# Patient Record
Sex: MALE | Race: WHITE | Employment: UNEMPLOYED | URBAN - NONMETROPOLITAN AREA
[De-identification: names, ages, dates, MRNs, and addresses within clinical notes are randomized per-mention and may not be internally consistent; named-entity substitution may affect disease eponyms.]

---

## 2020-03-12 ENCOUNTER — HOSPITAL ENCOUNTER (EMERGENCY)
Age: 35
Discharge: HOME | End: 2020-03-12
Payer: COMMERCIAL

## 2020-03-12 VITALS
RESPIRATION RATE: 16 BRPM | OXYGEN SATURATION: 97 % | DIASTOLIC BLOOD PRESSURE: 92 MMHG | HEART RATE: 115 BPM | WEIGHT: 289 LBS | HEIGHT: 66 IN | BODY MASS INDEX: 46.45 KG/M2 | TEMPERATURE: 98 F | SYSTOLIC BLOOD PRESSURE: 164 MMHG

## 2020-03-12 PROCEDURE — 99202 OFFICE O/P NEW SF 15 MIN: CPT

## 2020-03-12 PROCEDURE — 99202 OFFICE O/P NEW SF 15 MIN: CPT | Performed by: NURSE PRACTITIONER

## 2020-03-12 PROCEDURE — 6360000002 HC RX W HCPCS: Performed by: NURSE PRACTITIONER

## 2020-03-12 PROCEDURE — 90471 IMMUNIZATION ADMIN: CPT | Performed by: NURSE PRACTITIONER

## 2020-03-12 PROCEDURE — 90715 TDAP VACCINE 7 YRS/> IM: CPT | Performed by: NURSE PRACTITIONER

## 2020-03-12 RX ORDER — DOXYCYCLINE HYCLATE 100 MG
100 TABLET ORAL 2 TIMES DAILY
Qty: 14 TABLET | Refills: 0 | Status: SHIPPED | OUTPATIENT
Start: 2020-03-12 | End: 2020-03-19

## 2020-03-12 RX ADMIN — TETANUS TOXOID, REDUCED DIPHTHERIA TOXOID AND ACELLULAR PERTUSSIS VACCINE, ADSORBED 0.5 ML: 5; 2.5; 8; 8; 2.5 SUSPENSION INTRAMUSCULAR at 09:42

## 2020-03-12 SDOH — HEALTH STABILITY: MENTAL HEALTH: HOW OFTEN DO YOU HAVE A DRINK CONTAINING ALCOHOL?: NEVER

## 2020-03-12 ASSESSMENT — ENCOUNTER SYMPTOMS
RHINORRHEA: 0
ABDOMINAL PAIN: 0
EYE REDNESS: 0
NAUSEA: 0
SHORTNESS OF BREATH: 0
COUGH: 0
SORE THROAT: 0
VOMITING: 0
SINUS PRESSURE: 0
SINUS PAIN: 0
WHEEZING: 0
CHEST TIGHTNESS: 0
EYE PAIN: 0
ALLERGIC/IMMUNOLOGIC NEGATIVE: 1
DIARRHEA: 0
COLOR CHANGE: 1

## 2020-03-12 NOTE — ED TRIAGE NOTES
Patient ambulated to room with complaint of infection in right great toe. States Monday he grazed his toe on a metal door frame and has redness and pain with touching.

## 2020-03-12 NOTE — ED PROVIDER NOTES
frequency, hematuria and urgency. Musculoskeletal: Negative for arthralgias, gait problem, joint swelling, myalgias and stiffness. Skin: Positive for color change ( right 1st toe redness). Negative for pallor and rash. Allergic/Immunologic: Negative. Neurological: Negative for weakness, light-headedness and headaches. Hematological: Negative. Psychiatric/Behavioral: Negative. PAST MEDICAL HISTORY   History reviewed. No pertinent past medical history. SURGICAL HISTORY     Patient  has no past surgical history on file. CURRENT MEDICATIONS       Discharge Medication List as of 3/12/2020 10:10 AM          ALLERGIES     Patient is has No Known Allergies. FAMILY HISTORY     Patient'sfamily history is not on file. SOCIAL HISTORY     Patient  reports that he has never smoked. He has never used smokeless tobacco. He reports that he does not drink alcohol or use drugs. PHYSICAL EXAM     ED TRIAGE VITALS  BP: (!) 164/92(provider notified of bp and hr), Temp: 98 °F (36.7 °C), Pulse: 115, Resp: 16, SpO2: 97 %  Physical Exam  Constitutional:       General: He is not in acute distress. Appearance: Normal appearance. He is not ill-appearing. HENT:      Head: Normocephalic. Eyes:      General:         Right eye: No discharge. Left eye: No discharge. Extraocular Movements: Extraocular movements intact. Pupils: Pupils are equal, round, and reactive to light. Neck:      Musculoskeletal: Normal range of motion. No muscular tenderness. Cardiovascular:      Rate and Rhythm: Tachycardia present. Pulses: Normal pulses. Heart sounds: No murmur. Pulmonary:      Effort: Pulmonary effort is normal. No respiratory distress. Breath sounds: Normal breath sounds. No wheezing. Chest:      Chest wall: No tenderness. Abdominal:      Palpations: Abdomen is soft. Tenderness: There is no abdominal tenderness. There is no guarding.    Musculoskeletal: Normal range of motion. General: Swelling ( right 1st toe), tenderness ( right 1st toe) and signs of injury ( right 1st toe) present. Right foot: Normal range of motion. Feet:    Feet:      Right foot:      Skin integrity: Erythema ( right 1st toe) and warmth ( right 1st toe) present. Skin:     General: Skin is warm and dry. Capillary Refill: Capillary refill takes less than 2 seconds. Findings: Erythema ( right 1st toe) present. No rash. Neurological:      General: No focal deficit present. Mental Status: He is alert and oriented to person, place, and time. Motor: No weakness. Psychiatric:         Mood and Affect: Mood normal.         Behavior: Behavior normal.         Thought Content: Thought content normal.         Judgment: Judgment normal.         DIAGNOSTIC RESULTS   Labs: No results found for this visit on 03/12/20. IMAGING:  No orders to display     URGENT CARE COURSE:     Vitals:    03/12/20 0917 03/12/20 1011   BP: (!) 154/92 (!) 164/92   Pulse: 123 115   Resp: 16 16   Temp: 98 °F (36.7 °C)    TempSrc: Oral    SpO2: 97% 97%   Weight: 289 lb (131.1 kg)    Height: 5' 6\" (1.676 m)        Medications   Tetanus-Diphth-Acell Pertussis (BOOSTRIX) injection 0.5 mL (0.5 mLs Intramuscular Given 3/12/20 0942)     PROCEDURES:  None  FINALIMPRESSION    I have reviewed the patient's medical history in detail and updated the computerized patient record. Instructions given for care of the toe, including washing with soap and water, applying bactroban ointment twice daily and use of doxycycline for infection control. Pt to return to ER if condition changes or deteriorates.    1. Infected abrasion of toe of right foot, initial encounter        DISPOSITION/PLAN   DISPOSITION      PATIENT REFERRED TO:  [facility]  [address] [facility]  [phone]  In 3 days  As needed, If symptoms worsen    DISCHARGE MEDICATIONS:  Discharge Medication List as of 3/12/2020 10:10 AM      START taking these medications    Details   mupirocin (BACTROBAN) 2 % ointment Apply topically 3 times daily. , Disp-22 g, R-0, Normal      doxycycline hyclate (VIBRA-TABS) 100 MG tablet Take 1 tablet by mouth 2 times daily for 7 days, Disp-14 tablet, R-0Normal           Discharge Medication List as of 3/12/2020 10:10 AM          GUDELIA Vazquez - GUDELIA Archibald - CNP  03/12/20 [address] [facility], GUDELIA - CNP  03/12/20 1100

## 2020-03-18 ENCOUNTER — TELEPHONE (OUTPATIENT)
Dept: FAMILY MEDICINE CLINIC | Age: 35
End: 2020-03-18

## 2020-03-18 NOTE — TELEPHONE ENCOUNTER
Wife, Dustin Ma, called and is requesting to get patient established with Dr. Rowdy Astudillo but he also needs an Urgent Care follow up for a wound on his toe that has not completely healed yet. Today is his last day of antibiotics. Please advise on scheduling.

## 2020-03-18 NOTE — TELEPHONE ENCOUNTER
Spoke with patient who states pre service misunderstood him and states he was trying to get a hold of the [facility] call center phone number due to he has daughters who are immunocompromised at this time and he has some specific questions and symptoms to discuss and needs to be guided in the right direction.

## 2021-11-17 ENCOUNTER — TELEPHONE (OUTPATIENT)
Dept: FAMILY MEDICINE CLINIC | Age: 36
End: 2021-11-17

## 2021-11-17 NOTE — TELEPHONE ENCOUNTER
LM for pt to call and schedule. New patient appointments must be in office unless they are covid +. Please schedule for new patient appointment with any provider.

## 2021-11-17 NOTE — TELEPHONE ENCOUNTER
Pt returned call. Attemtped to schedule new patient. Pt denied NP in office appointment. Informed pt Office policy new pt appointments in office unless covid +. Pt states he will call back.

## 2022-03-09 ENCOUNTER — TELEMEDICINE (OUTPATIENT)
Dept: FAMILY MEDICINE CLINIC | Age: 37
End: 2022-03-09
Payer: COMMERCIAL

## 2022-03-09 DIAGNOSIS — R45.4 ANGER: ICD-10-CM

## 2022-03-09 DIAGNOSIS — F33.1 MODERATE EPISODE OF RECURRENT MAJOR DEPRESSIVE DISORDER (HCC): ICD-10-CM

## 2022-03-09 PROCEDURE — 99213 OFFICE O/P EST LOW 20 MIN: CPT | Performed by: NURSE PRACTITIONER

## 2022-03-09 RX ORDER — FLUOXETINE HYDROCHLORIDE 40 MG/1
40 CAPSULE ORAL DAILY
Qty: 30 CAPSULE | Refills: 3 | Status: SHIPPED | OUTPATIENT
Start: 2022-03-09 | End: 2022-06-20 | Stop reason: SDUPTHER

## 2022-03-09 ASSESSMENT — ENCOUNTER SYMPTOMS
COUGH: 0
EYE REDNESS: 0
DIARRHEA: 0
RHINORRHEA: 0
ANAL BLEEDING: 0
COLOR CHANGE: 0
BLOOD IN STOOL: 0
ABDOMINAL PAIN: 0
SHORTNESS OF BREATH: 0
NAUSEA: 0
CONSTIPATION: 0
ABDOMINAL DISTENTION: 0
SORE THROAT: 0
EYE DISCHARGE: 0

## 2022-03-09 NOTE — PROGRESS NOTES
Alyssa Breaux (:  1985) is a Established patient, here for evaluation of the following:    Assessment & Plan   Below is the assessment and plan developed based on review of pertinent history, physical exam, labs, studies, and medications. 1. Anger  -     FLUoxetine (PROZAC) 40 MG capsule; Take 1 capsule by mouth daily, Disp-30 capsule, R-3Normal  2. Moderate episode of recurrent major depressive disorder (HCC)  -     FLUoxetine (PROZAC) 40 MG capsule; Take 1 capsule by mouth daily, Disp-30 capsule, R-3Normal    Return in about 3 months (around 2022). Subjective   HPI     takign prozac 20 mg - helping with his mood and anger -he said he notices a difference but was also wondering about a dose increase. He cannot get into see psychology or psychiatry for another few months, has been doing a lot of self-help at home and trying to meditate which has helped. Review of Systems   Constitutional: Negative for chills, fatigue and fever. HENT: Negative for congestion, ear pain, postnasal drip, rhinorrhea and sore throat. Eyes: Negative for discharge and redness. Respiratory: Negative for cough and shortness of breath. Cardiovascular: Negative for chest pain and leg swelling. Gastrointestinal: Negative for abdominal distention, abdominal pain, anal bleeding, blood in stool, constipation, diarrhea and nausea. Skin: Negative for color change and rash. Neurological: Negative for facial asymmetry, speech difficulty and weakness. Hematological: Does not bruise/bleed easily. Psychiatric/Behavioral: Negative for agitation and confusion. Objective   Patient-Reported Vitals  No data recorded     Physical Exam  Constitutional:       Appearance: Normal appearance. He is well-developed. He is not toxic-appearing or diaphoretic. HENT:      Head: Normocephalic and atraumatic. Right Ear: Hearing normal.      Left Ear: Hearing normal.      Nose: No nasal deformity.    Eyes: General:         Right eye: No discharge. Left eye: No discharge. Pulmonary:      Effort: Pulmonary effort is normal. No prolonged expiration or respiratory distress. Musculoskeletal:      Cervical back: Normal range of motion. Skin:     Findings: No rash (On exposed areas visible on camera). Neurological:      Mental Status: He is alert and oriented to person, place, and time. Psychiatric:         Attention and Perception: Attention normal.         Mood and Affect: Mood normal.         Speech: Speech normal.         Behavior: Behavior normal.         Thought Content: Thought content normal.         Cognition and Memory: Cognition and memory normal.         Judgment: Judgment normal.              On this date 3/9/2022 I have spent 20 minutes reviewing previous notes, test results and face to face (virtual) with the patient discussing the diagnosis and importance of compliance with the treatment plan as well as documenting on the day of the visit. Jeremy Garcia, was evaluated through a synchronous (real-time) audio-video encounter. The patient (or guardian if applicable) is aware that this is a billable service, which includes applicable co-pays. This Virtual Visit was conducted with patient's (and/or legal guardian's) consent. The visit was conducted pursuant to the emergency declaration under the [address] [place] [address], [address] [place] [address] authority and the [employer] General Act. Patient identification was verified, and a caregiver was present when appropriate. The patient was located at home in a state where the provider was licensed to provide care.        --GUDELIA Plata - CNP

## 2022-04-21 ENCOUNTER — TELEPHONE (OUTPATIENT)
Dept: FAMILY MEDICINE CLINIC | Age: 37
End: 2022-04-21

## 2022-04-21 NOTE — TELEPHONE ENCOUNTER
----- Message from Gabbymadelyn Cote sent at 4/21/2022 11:54 AM EDT -----  Subject: Message to Provider    QUESTIONS  Information for Provider? Pt wants future scripts to be sent to mail   delivery. [facility]. He does not know the adress to [facility]  ---------------------------------------------------------------------------  --------------  [address] is the best way for the office to contact you? OK to leave message on   voicemail  Preferred Call Back Phone Number? [phone]  ---------------------------------------------------------------------------  --------------  SCRIPT ANSWERS  Relationship to Patient?  Self

## 2022-06-09 ENCOUNTER — TELEPHONE (OUTPATIENT)
Dept: FAMILY MEDICINE CLINIC | Age: 37
End: 2022-06-09

## 2022-06-09 NOTE — TELEPHONE ENCOUNTER
Ashley Joe no show their appointment on 06/09/2022. Appointment was not confirmed by Ashley Joe. Staff member, Laila Rosenthal called the patient 06/08/2022, left a voicemail to confirm appointment. Staff member, Ngozi Kapoor called the patient 06/09/2022, left a voicemail to help patient to reschedule their no show. No show letter has been made, sent, printed and mailed to patient.

## 2022-06-20 ENCOUNTER — OFFICE VISIT (OUTPATIENT)
Dept: PSYCHIATRY | Age: 37
End: 2022-06-20
Payer: COMMERCIAL

## 2022-06-20 DIAGNOSIS — F40.10 SOCIAL ANXIETY DISORDER: ICD-10-CM

## 2022-06-20 DIAGNOSIS — F33.1 MAJOR DEPRESSIVE DISORDER, RECURRENT EPISODE, MODERATE (HCC): Primary | ICD-10-CM

## 2022-06-20 PROBLEM — F34.1 PERSISTENT DEPRESSIVE DISORDER: Status: ACTIVE | Noted: 2022-06-20

## 2022-06-20 PROCEDURE — 90792 PSYCH DIAG EVAL W/MED SRVCS: CPT

## 2022-06-20 RX ORDER — FLUOXETINE HYDROCHLORIDE 20 MG/1
60 CAPSULE ORAL DAILY
Qty: 90 CAPSULE | Refills: 0 | Status: SHIPPED | OUTPATIENT
Start: 2022-06-20 | End: 2022-06-20 | Stop reason: SDUPTHER

## 2022-06-20 NOTE — PATIENT INSTRUCTIONS
-Please take medications as prescribed  -Refrain from alcohol or drug use  -Seek emergency help via the emergency and/or calling 911 should symptoms become severe, worsen, or with other concerning symptoms. Go immediately to the emergency room and/or call 911 with any suicidal or homicidal ideations or if audio/visual hallucinations develop.   -Contact office with any questions or concerns. Crisis phone numbers:  Rick Serrato, and .[facility] [phone]. Cabrera Johnson and [place] [id number] [place] [address] [phone]. [facility] [phone]. [facility] [address] [phone].

## 2022-06-20 NOTE — PROGRESS NOTES
[address] [facility] [address]  [facility] [place] [address]  Dept: [phone]  Dept Fax: [phone]  Loc: [phone]    Visit Date: 6/20/2022    SUBJECTIVE DATA     CHIEF COMPLAINT:    Chief Complaint   Patient presents with    Anxiety    Depression    New Patient       History obtained from: patient    HISTORY OF PRESENT ILLNESS:      Brandan Arndt is a 42 y.o. male who presents to the office to establish care of management of depression. Patient referred by PCP. Reports he has been taking Prozac 40 mg PO for 3-4 months for depression rx by his PCP. Reports medication compliance. Reports medication is working [employer], it worked better at first then it kind of has worn off. \" Denies side effects. Reports feeling sad and down most days of the week for \"a while. \" Reports some irritability, states he can be \"easily annoyed. \" Reports will occasaionlly have a verbal anger outbursts in which he \"get frustrated and yells\", denies physical outbursts. Endorses decreased energy and motivation. Denies anhedonia. Appetite is \"ok. \" Blanca feelings of hopelessness, helplessness or guilt. Endorses low self-esteem. Reports concentration is \"ok. \" Denies sleep disturbances, reports feeling rested for the most part, will \"doze off\" occasionally in the evenings while watching TV. States he wears a \"nose appliance\" at bedtime, unsure if he snores, has never been assessed for sleep apnea. Denies suicidal or homicidal thoughts. Reports he first noticed depression around the age of 21years old when his mother passed away from ladonna's disease. States since then he he has had episodes \"on and off\" of depression which have usually included feeling sad, down, irritability and lack of motivation/energy. Reports he had never received treatment for depression in the past until 4 months ago when he was rx Prozac by PCP.   Denies feeling anxious, worrying, feeling on daily, last drink in the am; occasional soda with dinner  Social History     Socioeconomic History    Marital status:      Spouse name: Not on file    Number of children: Not on file    Years of education: Not on file    Highest education level: Not on file   Occupational History    Not on file   Tobacco Use    Smoking status: Never Smoker    Smokeless tobacco: Never Used   Vaping Use    Vaping Use: Never used   Substance and Sexual Activity    Alcohol use: Never    Drug use: Never    Sexual activity: Not on file   Other Topics Concern    Not on file   Social History Narrative    Not on file     Social Determinants of Health     Financial Resource Strain:     Difficulty of Paying Living Expenses: Not on file   Food Insecurity:     Worried About [address] in the Last Year: Not on file    [address] [place] in the Last Year: Not on file   Transportation Needs:     Lack of Transportation (Medical): Not on file    Lack of Transportation (Non-Medical):  Not on file   Physical Activity:     Days of Exercise per Week: Not on file    Minutes of Exercise per Session: Not on file   Stress:     Feeling of Stress : Not on file   Social Connections:     Frequency of Communication with Friends and Family: Not on file    Frequency of Social Gatherings with Friends and Family: Not on file    Attends [employer] Services: Not on file    Active Member of [address] [place] [address] or Organizations: Not on file    Attends Club or Organization Meetings: Not on file    Marital Status: Not on file   Intimate Partner Violence:     Fear of Current or Ex-Partner: Not on file    Emotionally Abused: Not on file    Physically Abused: Not on file    Sexually Abused: Not on file   Housing Stability:     Unable to Pay for Housing in the Last Year: Not on file    Number of [place] in the Last Year: Not on file    Unstable Housing in the Last Year: Not on file       FAMILY HISTORY:     Family History   Problem Relation Age of Onset    Huntingtons Disease Mother          Psychiatric Family History  Suicides: Denies  Substance abuse: Denies  Mental illness: Denies    PAST MEDICAL HISTORY:    Past Medical History:   Diagnosis Date    Depression        PAST SURGICAL HISTORY:    History reviewed. No pertinent surgical history. PREVIOUSMEDICATIONS:  Outpatient Medications Prior to Visit   Medication Sig Dispense Refill    FLUoxetine (PROZAC) 40 MG capsule Take 1 capsule by mouth daily 30 capsule 3     No facility-administered medications prior to visit. ALLERGIES:    Patient has no known allergies. [facility] P[facility]    OBJECTIVE DATA     There were no vitals taken for this visit. Wt Readings from Last 3 Encounters:   03/12/20 289 lb (131.1 kg)        Physical Exam    Constitutional:  Appears well-developed and well-nourished, no acute distress  HENT:   Head: Normocephalic and atraumatic. Eyes: Conjunctivae are normal. Right eye exhibits no discharge. Left eye exhibits no discharge. Neck: Normal range of motion. Neck supple. Pulmonary/Chest:  Respirations easy and unlabored, no accessory muscle use or no audible wheezing noted. Musculoskeletal: Normal range of motion. Neurological: normal gait and station, no tremor noted. Skin: Skin is warm and dry. Patient is not diaphoretic. Mental Status Evaluation:   Orientation: Alert, oriented, thought content appropriate   Mood:. Depressed      Affect:  Normal      Appearance:  Casually Dressed, Overweight, Clean and Well Groomed   Activity:  Cooperative and Good Eye Contact   Gait/Posture: Normal   Speech:  Clear, Fluent, Normal Pitch and Volume, Age and Situation Appropriate   Thought Process: Within Normal Limits   Thought Content:   Within Normal Limits   Cognition:  Grossly Intact   Memory: Intact   Insight:  Age Appropriate   Judgment: Age Appropriate   Suicidal Ideations: Denies Suicidal Ideation   Homicidal Ideations: Negative for homicidal ideation symptoms become severe, worsen, or with other concerning symptoms. Patient instructed to go immediately to the emergency room and/or call 911 with any suicidal or homicidal ideations or if audio/visual hallucinations develop. Patient given crisis center information. Patient stated understanding and agrees. Support and reassurance given. All questions answered. Patient stated understanding and is agreeable to treatment plan. Encouraged to call office with any questions or concerns. Provider Signature:  Electronically signed by GUDELIA Stevens CNP on 6/20/2022 at 2:31 PM    **This report has been created using voice recognition software. It may contain minor errors which are inherent in voice recognition technology. **

## 2022-06-21 PROBLEM — F34.1 PERSISTENT DEPRESSIVE DISORDER: Status: RESOLVED | Noted: 2022-06-20 | Resolved: 2022-06-21

## 2022-06-21 RX ORDER — FLUOXETINE HYDROCHLORIDE 20 MG/1
60 CAPSULE ORAL DAILY
Qty: 90 CAPSULE | Refills: 0 | Status: SHIPPED | OUTPATIENT
Start: 2022-06-21 | End: 2022-07-05 | Stop reason: SDUPTHER

## 2022-06-21 NOTE — TELEPHONE ENCOUNTER
[facility] [place] called stating his insurance requires him to use [address] for medication. Insurance requires 90 day supply.        Requested Prescriptions     Pending Prescriptions Disp Refills    FLUoxetine (PROZAC) 20 MG capsule 270 capsule 0     Sig: Take 3 capsules by mouth daily       Date of last visit: 6/20/2022  Date of next visit (if applicable):7/19/2022  Pharmacy Name: [facility]      Thanks,  Mary Kay Mauricio, [address]
Spoke with Bari Cheney regarding Rx. He did confirm with Danni that Rx has to come through [id number]. He is fine with a 30 day supply. Rx pending.
present

## 2022-07-05 RX ORDER — FLUOXETINE HYDROCHLORIDE 20 MG/1
60 CAPSULE ORAL DAILY
Qty: 90 CAPSULE | Refills: 0 | Status: SHIPPED | OUTPATIENT
Start: 2022-07-19 | End: 2022-08-18

## 2022-07-05 RX ORDER — FLUOXETINE HYDROCHLORIDE 20 MG/1
CAPSULE ORAL
Qty: 90 CAPSULE | Refills: 0 | OUTPATIENT
Start: 2022-07-05

## 2022-07-05 NOTE — TELEPHONE ENCOUNTER
[id number] called to request additional 30 day supply of Prozac 20 mg, reporting 7-10 day shipping time frame for patient. Advised that patient has an upcoming appt, they request Rx so patient doesn't run out in the meantime.

## 2022-11-29 ENCOUNTER — HOSPITAL ENCOUNTER (EMERGENCY)
Age: 37
Discharge: HOME | End: 2022-11-29
Payer: COMMERCIAL

## 2022-11-29 VITALS
HEART RATE: 116 BPM | WEIGHT: 247 LBS | DIASTOLIC BLOOD PRESSURE: 96 MMHG | RESPIRATION RATE: 18 BRPM | SYSTOLIC BLOOD PRESSURE: 180 MMHG | BODY MASS INDEX: 37.44 KG/M2 | HEIGHT: 68 IN | OXYGEN SATURATION: 98 % | TEMPERATURE: 98.7 F

## 2022-11-29 DIAGNOSIS — J10.1 INFLUENZA A: Primary | ICD-10-CM

## 2022-11-29 LAB
FLU A ANTIGEN: POSITIVE
FLU B ANTIGEN: NEGATIVE

## 2022-11-29 PROCEDURE — 87804 INFLUENZA ASSAY W/OPTIC: CPT

## 2022-11-29 PROCEDURE — 99213 OFFICE O/P EST LOW 20 MIN: CPT

## 2022-11-29 PROCEDURE — 99213 OFFICE O/P EST LOW 20 MIN: CPT | Performed by: NURSE PRACTITIONER

## 2022-11-29 RX ORDER — OSELTAMIVIR PHOSPHATE 75 MG/1
75 CAPSULE ORAL 2 TIMES DAILY
Qty: 10 CAPSULE | Refills: 0 | Status: SHIPPED | OUTPATIENT
Start: 2022-11-29 | End: 2022-12-04

## 2022-11-29 ASSESSMENT — ENCOUNTER SYMPTOMS
WHEEZING: 0
VOMITING: 0
RHINORRHEA: 0
ABDOMINAL PAIN: 0
BLOOD IN STOOL: 0
COUGH: 1
SHORTNESS OF BREATH: 0
EYE PAIN: 0
SINUS PRESSURE: 0
SORE THROAT: 0
NAUSEA: 0
DIARRHEA: 0
CONSTIPATION: 0

## 2022-11-29 ASSESSMENT — PAIN - FUNCTIONAL ASSESSMENT: PAIN_FUNCTIONAL_ASSESSMENT: NONE - DENIES PAIN

## 2022-11-29 NOTE — ED PROVIDER NOTES
[id number]       Chief Complaint   Patient presents with    Cough    Nasal Congestion     Runny Nose         Nurses Notes reviewed and I agree except as noted in the HPI. HISTORY OF PRESENT ILLNESS   Concepción Crandall is a 42 y.o. male who presents to urgent care with complaint of cough, nasal congestion and fatigue that has been ongoing for the last 3 days. Patient states he took a COVID-19 test and it was negative. Patient states has been taking over-the-counter medication such as Mucinex for his symptoms. Patient denies other symptoms including sore throat, chest pain, shortness of breath, nausea, vomiting, diarrhea or fever. REVIEW OF SYSTEMS     Review of Systems   Constitutional:  Positive for fatigue. Negative for appetite change, chills, fever and unexpected weight change. HENT:  Positive for congestion. Negative for ear pain, rhinorrhea, sinus pressure and sore throat. Eyes:  Negative for pain and visual disturbance. Respiratory:  Positive for cough. Negative for shortness of breath and wheezing. Cardiovascular:  Negative for chest pain, palpitations and leg swelling. Gastrointestinal:  Negative for abdominal pain, blood in stool, constipation, diarrhea, nausea and vomiting. Genitourinary:  Negative for dysuria, frequency and hematuria. Musculoskeletal:  Negative for arthralgias and joint swelling. Skin:  Negative for rash. Neurological:  Negative for dizziness, syncope, weakness, light-headedness and headaches. Hematological:  Does not bruise/bleed easily. PAST MEDICAL HISTORY         Diagnosis Date    Depression        SURGICAL HISTORY     Patient  has no past surgical history on file. CURRENT MEDICATIONS       Discharge Medication List as of 11/29/2022  2:29 PM          ALLERGIES     Patient is has No Known Allergies.     FAMILY HISTORY     Patient'sfamily history includes Huntingtons Disease in his and symptomatic treatment. Patient follow-up with primary care provider. Patient instructed go to ER for worsening symptoms, chest pain, inability to keep liquids down, inability to urinate for greater than 8 hours or difficulty breathing. Follow-up with your primary care provider. May take Tylenol or ibuprofen as needed for pain or fever. Medications - No data to display  PROCEDURES:    Procedures    FINALIMPRESSION      1. Influenza A        DISPOSITION/PLAN   DISPOSITION Decision To Discharge 11/29/2022 02:29:08 PM    PATIENT REFERRED TO:  [place] [address]  Schedule an appointment as soon as possible for a visit   to establish care  DISCHARGE MEDICATIONS:  Discharge Medication List as of 11/29/2022  2:29 PM        START taking these medications    Details   oseltamivir (TAMIFLU) 75 MG capsule Take 1 capsule by mouth 2 times daily for 5 days, Disp-10 capsule, R-0Normal           Discharge Medication List as of 11/29/2022  2:29 PM          Yeni Nurse, APRN - CNP       Yeni Nurse, APRN - CNP  11/29/22 8239

## 2022-11-29 NOTE — Clinical Note
Bhavya Alvarado was seen and treated in our emergency department on 11/29/2022. He may return to work on 12/01/2022. If you have any questions or concerns, please don't hesitate to call.       Yeni Yates, APRN - CNP

## 2022-11-29 NOTE — ED TRIAGE NOTES
Arrives to [facility] for the evaluation of productive cough and nasal congestion that started 3 days ago. Was exposed to [place] and took a COVID test today that was negative. States he is nervous to be here. Is tachycardic and BP elevated. Taking OTC mucinex for symptoms. Skin pale. Respirations unlabored. Not actively coughing at this time. Waiting provider to assess.

## 2023-05-24 ENCOUNTER — HOSPITAL ENCOUNTER (EMERGENCY)
Age: 38
Discharge: HOME | End: 2023-05-24

## 2023-05-24 VITALS
OXYGEN SATURATION: 96 % | RESPIRATION RATE: 16 BRPM | DIASTOLIC BLOOD PRESSURE: 92 MMHG | SYSTOLIC BLOOD PRESSURE: 144 MMHG | HEART RATE: 103 BPM | TEMPERATURE: 98.2 F

## 2023-05-24 DIAGNOSIS — H60.502 ACUTE OTITIS EXTERNA OF LEFT EAR, UNSPECIFIED TYPE: ICD-10-CM

## 2023-05-24 DIAGNOSIS — J06.9 UPPER RESPIRATORY TRACT INFECTION, UNSPECIFIED TYPE: Primary | ICD-10-CM

## 2023-05-24 PROCEDURE — 99213 OFFICE O/P EST LOW 20 MIN: CPT

## 2023-05-24 RX ORDER — CETIRIZINE HYDROCHLORIDE 10 MG/1
10 TABLET ORAL DAILY
Qty: 30 TABLET | Refills: 0 | Status: SHIPPED | OUTPATIENT
Start: 2023-05-24 | End: 2023-06-23

## 2023-05-24 RX ORDER — PREDNISONE 20 MG/1
20 TABLET ORAL 2 TIMES DAILY
Qty: 10 TABLET | Refills: 0 | Status: SHIPPED | OUTPATIENT
Start: 2023-05-24 | End: 2023-05-29

## 2023-05-24 RX ORDER — GUAIFENESIN, PSEUDOEPHEDRINE HYDROCHLORIDE 600; 60 MG/1; MG/1
1 TABLET, EXTENDED RELEASE ORAL EVERY 12 HOURS PRN
Qty: 14 TABLET | Refills: 0 | Status: SHIPPED | OUTPATIENT
Start: 2023-05-24 | End: 2023-06-07

## 2023-05-24 RX ORDER — FLUTICASONE PROPIONATE 50 MCG
2 SPRAY, SUSPENSION (ML) NASAL DAILY
Qty: 16 G | Refills: 0 | Status: SHIPPED | OUTPATIENT
Start: 2023-05-24

## 2023-05-24 ASSESSMENT — PAIN DESCRIPTION - FREQUENCY: FREQUENCY: CONTINUOUS

## 2023-05-24 ASSESSMENT — PAIN DESCRIPTION - DESCRIPTORS: DESCRIPTORS: ITCHING

## 2023-05-24 ASSESSMENT — ENCOUNTER SYMPTOMS
RHINORRHEA: 1
DIARRHEA: 0
VOMITING: 0
CHEST TIGHTNESS: 0
COUGH: 1
NAUSEA: 0
SHORTNESS OF BREATH: 0
SORE THROAT: 1

## 2023-05-24 ASSESSMENT — PAIN SCALES - GENERAL: PAINLEVEL_OUTOF10: 5

## 2023-05-24 ASSESSMENT — PAIN DESCRIPTION - LOCATION: LOCATION: EAR;THROAT

## 2023-05-24 ASSESSMENT — PAIN DESCRIPTION - PAIN TYPE: TYPE: ACUTE PAIN

## 2023-05-24 ASSESSMENT — PAIN - FUNCTIONAL ASSESSMENT: PAIN_FUNCTIONAL_ASSESSMENT: 0-10

## 2023-05-24 NOTE — ED PROVIDER NOTES
[facility] 34  Urgent Care Encounter       CHIEF COMPLAINT       Chief Complaint   Patient presents with    Cough    Pharyngitis     Onset Sunday night, now itching in his throat       Nurses Notes reviewed and I agree except as noted in the HPI. HISTORY OF PRESENT ILLNESS   Alis Rodríguez is a 34 y.o. male who presents to the [facility] urgent care for evaluation of cough and pharyngitis. He reports his symptoms started Sunday, 4 days ago. He denies known exposure to someone ill. He reports rhinorrhea, cough, scratchy throat, and bilateral otalgia. He reports that his cough is worse at night. He denies known postnasal drainage. Denies fever or chills. Denies nausea, vomiting, diarrhea. The history is provided by the patient. No  was used. REVIEW OF SYSTEMS     Review of Systems   Constitutional:  Negative for activity change, appetite change, chills, fatigue and fever. HENT:  Positive for ear pain, rhinorrhea and sore throat. Negative for ear discharge. Respiratory:  Positive for cough. Negative for chest tightness and shortness of breath. Cardiovascular:  Negative for chest pain. Gastrointestinal:  Negative for diarrhea, nausea and vomiting. Genitourinary:  Negative for dysuria. Skin:  Negative for rash. Allergic/Immunologic: Negative for environmental allergies and food allergies. Neurological:  Negative for dizziness and headaches. PAST MEDICAL HISTORY         Diagnosis Date    Depression        SURGICALHISTORY     Patient  has no past surgical history on file. CURRENT MEDICATIONS       Discharge Medication List as of 5/24/2023  8:51 AM          ALLERGIES     Patient is has No Known Allergies.     Patients   Immunization History   Administered Date(s) Administered    COVID-19, PFIZER PURPLE top, DILUTE for use, (age 15 y+), 30mcg/0.3mL 07/12/2021, 01/11/2022, 02/10/2022    TDaP, ADACEL (age 10y-63y), BOOSTRIX (age 10y+), IM, 0.5mL

## 2025-05-06 ENCOUNTER — TELEPHONE (OUTPATIENT)
Dept: FAMILY MEDICINE CLINIC | Age: 40
End: 2025-05-06

## 2025-05-06 NOTE — TELEPHONE ENCOUNTER
Left message for Deon Rosario to return call. Unsure what Genetic testing patient is requesting has not been seen by us since 03/09/2022.

## 2025-05-06 NOTE — TELEPHONE ENCOUNTER
----- Message from ULISSES BAKER MA sent at 5/1/2025  4:19 PM EDT -----  Regarding: FW: ECC Message to Provider    ----- Message -----  From: Osmin Bañuelos Jr.  Sent: 5/1/2025   1:59 PM EDT  To: [facility]. Clinical Staff  Subject: ECC Message to Provider                          ECC Message to Provider    Relationship to Patient: Self     Additional Information want to get the copy of his recent genetic test done . Patient wanted the practice to call back  --------------------------------------------------------------------------------------------------------------------------    Call Back Information: OK to leave message on voicemail  Preferred Call Back Number: Phone  [phone]